# Patient Record
Sex: FEMALE | Race: WHITE
[De-identification: names, ages, dates, MRNs, and addresses within clinical notes are randomized per-mention and may not be internally consistent; named-entity substitution may affect disease eponyms.]

---

## 2018-11-27 ENCOUNTER — HOSPITAL ENCOUNTER (EMERGENCY)
Dept: HOSPITAL 76 - ED | Age: 23
LOS: 1 days | Discharge: HOME | End: 2018-11-28
Payer: MEDICAID

## 2018-11-27 ENCOUNTER — HOSPITAL ENCOUNTER (OUTPATIENT)
Dept: HOSPITAL 76 - EMS | Age: 23
Discharge: ANOTHER HOSPITAL | End: 2018-11-27
Attending: SURGERY
Payer: MEDICAID

## 2018-11-27 DIAGNOSIS — R07.9: Primary | ICD-10-CM

## 2018-11-27 LAB
BASOPHILS NFR BLD AUTO: 0.1 10^3/UL (ref 0–0.1)
BASOPHILS NFR BLD AUTO: 0.8 %
EOSINOPHIL # BLD AUTO: 0.2 10^3/UL (ref 0–0.7)
EOSINOPHIL NFR BLD AUTO: 1.5 %
ERYTHROCYTE [DISTWIDTH] IN BLOOD BY AUTOMATED COUNT: 13.7 % (ref 12–15)
HGB UR QL STRIP: 12.6 G/DL (ref 12–16)
LYMPHOCYTES # SPEC AUTO: 3.1 10^3/UL (ref 1.5–3.5)
LYMPHOCYTES NFR BLD AUTO: 26.3 %
MCH RBC QN AUTO: 28 PG (ref 27–31)
MCHC RBC AUTO-ENTMCNC: 34.3 G/DL (ref 32–36)
MCV RBC AUTO: 81.8 FL (ref 81–99)
MONOCYTES # BLD AUTO: 0.9 10^3/UL (ref 0–1)
MONOCYTES NFR BLD AUTO: 7.7 %
NEUTROPHILS # BLD AUTO: 7.5 10^3/UL (ref 1.5–6.6)
NEUTROPHILS # SNV AUTO: 11.9 X10^3/UL (ref 4.8–10.8)
NEUTROPHILS NFR BLD AUTO: 63.7 %
PDW BLD AUTO: 8.1 FL (ref 7.9–10.8)
PLATELET # BLD: 238 10^3/UL (ref 130–450)
RBC MAR: 4.51 10^6/UL (ref 4.2–5.4)

## 2018-11-27 PROCEDURE — 80053 COMPREHEN METABOLIC PANEL: CPT

## 2018-11-27 PROCEDURE — 81025 URINE PREGNANCY TEST: CPT

## 2018-11-27 PROCEDURE — 83690 ASSAY OF LIPASE: CPT

## 2018-11-27 PROCEDURE — 85025 COMPLETE CBC W/AUTO DIFF WBC: CPT

## 2018-11-27 PROCEDURE — 84484 ASSAY OF TROPONIN QUANT: CPT

## 2018-11-27 PROCEDURE — 99283 EMERGENCY DEPT VISIT LOW MDM: CPT

## 2018-11-27 PROCEDURE — 76705 ECHO EXAM OF ABDOMEN: CPT

## 2018-11-27 PROCEDURE — 71046 X-RAY EXAM CHEST 2 VIEWS: CPT

## 2018-11-27 PROCEDURE — 36415 COLL VENOUS BLD VENIPUNCTURE: CPT

## 2018-11-27 PROCEDURE — 93005 ELECTROCARDIOGRAM TRACING: CPT

## 2018-11-27 NOTE — ED PHYSICIAN DOCUMENTATION
PD HPI CHEST PAIN





- Stated complaint


Stated Complaint: CP, HEART RACING





- History obtained from


History obtained from: Patient





- History of Present Illness


Timing - onset: Enter  time (22:00), Today


Timing - onset during: Light activity


Timing - details: Abrupt onset


Pain level now: 4


Quality: Pain (burning)


Location: Substernal


Radiation: Abdominal, Right upper extremity


Improved by: Nothing


Worsened by: Other (no exacerbating factors)


Associated symptoms: Shortness of air, Palpitations


Similar symptoms before: Has not had sx before


Recently seen: Not recently seen





Review of Systems


Constitutional: reports: Reviewed and negative


Cardiac: reports: Chest pain / pressure, Palpitations


Respiratory: reports: Dyspnea


GI: reports: Abdominal Pain.  denies: Nausea, Vomiting





PD PAST MEDICAL HISTORY





- Past Medical History


Psych: Depression, Anxiety





- Past Surgical History


Past Surgical History: No





- Present Medications


Home Medications: 


                                Ambulatory Orders











 Medication  Instructions  Recorded  Confirmed


 


Bupropion HCl [Bupropion Xl]  11/27/18 


 


Norgestimate-Ethinyl Estradiol  11/27/18 





[Tri-Linyah Tablet]   


 


Quetiapine Fumarate  11/27/18 














- Allergies


Allergies/Adverse Reactions: 


                                    Allergies











Allergy/AdvReac Type Severity Reaction Status Date / Time


 


nut - unspecified Allergy Severe Anaphylaxis Verified 11/27/18 22:53


 


bee venom protein (honey bee) Allergy  Anaphylaxis Verified 11/27/18 22:52














- Social History


Does the pt smoke?: No


Smoking Status: Never smoker


Does the pt drink ETOH?: No


Does the pt have substance abuse?: No





- Immunizations


Immunizations are current?: No


Immunizations: TDAP >10years/unknown





PD ED PE NORMAL





- Vitals


Vital signs reviewed: Yes





- General


General: Alert and oriented X 3, No acute distress, Well developed/nourished





- Cardiac


Cardiac: RRR, No murmur, No gallop, No rub





- Respiratory


Respiratory: No respiratory distress, Clear bilaterally





- Abdomen


Abdomen: Soft, Non distended, Other (mild TTP epigastrium and RUQ)





- Back


Back: No CVA TTP





Results





- Vitals


Vitals: 


                                     Oxygen











O2 Source                      Room air

















- Labs


Labs: 


                                Laboratory Tests











  11/27/18 11/27/18 11/27/18





  23:50 23:50 23:50


 


WBC  11.9 H  


 


RBC  4.51  


 


Hgb  12.6  


 


Hct  36.9 L  


 


MCV  81.8  


 


MCH  28.0  


 


MCHC  34.3  


 


RDW  13.7  


 


Plt Count  238  


 


MPV  8.1  


 


Neut # (Auto)  7.5 H  


 


Lymph # (Auto)  3.1  


 


Mono # (Auto)  0.9  


 


Eos # (Auto)  0.2  


 


Baso # (Auto)  0.1  


 


Absolute Nucleated RBC  0.00  


 


Nucleated RBC %  0.0  


 


Sodium   134 L 


 


Potassium   3.4 L 


 


Chloride   99 L 


 


Carbon Dioxide   22 


 


Anion Gap   13.0 


 


BUN   13 


 


Creatinine   0.8 


 


Estimated GFR (MDRD)   90 


 


Glucose   259 H 


 


Calcium   8.8 


 


Total Bilirubin   0.4 


 


AST   22 


 


ALT   22 


 


Alkaline Phosphatase   85 


 


Troponin I    < 0.04


 


Total Protein   7.2 


 


Albumin   3.5 


 


Globulin   3.7 


 


Albumin/Globulin Ratio   0.9 L 


 


Lipase   19 L 


 


Ur Specific Gravity   


 


Urine HCG, Qual   














  11/28/18





  00:00


 


WBC 


 


RBC 


 


Hgb 


 


Hct 


 


MCV 


 


MCH 


 


MCHC 


 


RDW 


 


Plt Count 


 


MPV 


 


Neut # (Auto) 


 


Lymph # (Auto) 


 


Mono # (Auto) 


 


Eos # (Auto) 


 


Baso # (Auto) 


 


Absolute Nucleated RBC 


 


Nucleated RBC % 


 


Sodium 


 


Potassium 


 


Chloride 


 


Carbon Dioxide 


 


Anion Gap 


 


BUN 


 


Creatinine 


 


Estimated GFR (MDRD) 


 


Glucose 


 


Calcium 


 


Total Bilirubin 


 


AST 


 


ALT 


 


Alkaline Phosphatase 


 


Troponin I 


 


Total Protein 


 


Albumin 


 


Globulin 


 


Albumin/Globulin Ratio 


 


Lipase 


 


Ur Specific Gravity  1.020


 


Urine HCG, Qual  NEGATIVE














- Rads (name of study)


  ** chest xray


Radiology: Prelim report reviewed, See rad report





  ** RUQ US


Radiology: Prelim report reviewed, See rad report





PD MEDICAL DECISION MAKING





- ED course


Complexity details: reviewed results, re-evaluated patient, considered 

differential, d/w patient





Departure





- Departure


Disposition: 01 Home, Self Care


Clinical Impression: 


 Chest pain





Condition: Good


Instructions:  ED Chest Pain Atypical Unkn Cause, ED Hyperglycemia New Susp Tia

betes


Follow-Up: 


[facility] [Provider Group]


[facility] [Provider Group]


Comments: 


The tests performed tonight in the emergency department do not show what caused 

your symptoms. If your symptoms persist, your doctor might order further tests. 

If your symptoms worsen, you can always return to the emergency department for 

reevaluation. 


Your blood sugar was elevated tonight. This does not require further testing in 

the emergency department nor treatment, such as medications, at this time. 

However, you need to follow up with your doctor to have further testing. Call 

your doctor's office in the morning to arrange for next available appointment; 

if possible, it would be best to arrange an appointment by the end of the week.


Discharge Date/Time: 11/28/18 01:25

## 2018-11-28 VITALS — SYSTOLIC BLOOD PRESSURE: 109 MMHG | DIASTOLIC BLOOD PRESSURE: 79 MMHG

## 2018-11-28 LAB
ALBUMIN DIAFP-MCNC: 3.5 G/DL (ref 3.2–5.5)
ALBUMIN/GLOB SERPL: 0.9 {RATIO} (ref 1–2.2)
ALP SERPL-CCNC: 85 IU/L (ref 42–121)
ALT SERPL W P-5'-P-CCNC: 22 IU/L (ref 10–60)
ANION GAP SERPL CALCULATED.4IONS-SCNC: 13 MMOL/L (ref 6–13)
AST SERPL W P-5'-P-CCNC: 22 IU/L (ref 10–42)
BILIRUB BLD-MCNC: 0.4 MG/DL (ref 0.2–1)
BUN SERPL-MCNC: 13 MG/DL (ref 6–20)
CALCIUM UR-MCNC: 8.8 MG/DL (ref 8.5–10.3)
CHLORIDE SERPL-SCNC: 99 MMOL/L (ref 101–111)
CO2 SERPL-SCNC: 22 MMOL/L (ref 21–32)
CREAT SERPLBLD-SCNC: 0.8 MG/DL (ref 0.4–1)
GFRSERPLBLD MDRD-ARVRAT: 90 ML/MIN/{1.73_M2} (ref 89–?)
GLOBULIN SER-MCNC: 3.7 G/DL (ref 2.1–4.2)
GLUCOSE SERPL-MCNC: 259 MG/DL (ref 70–100)
HCG UR QL: NEGATIVE
LIPASE SERPL-CCNC: 19 U/L (ref 22–51)
PROT SPEC-MCNC: 7.2 G/DL (ref 6.7–8.2)
SODIUM SERPLBLD-SCNC: 134 MMOL/L (ref 135–145)
SP GR UR STRIP.AUTO: 1.02 (ref 1–1.03)

## 2018-11-28 NOTE — ULTRASOUND REPORT
Reason:  epigastric/RUQ pain

Procedure Date:  11/28/2018   

Accession Number:  [id number] / [id number]                    

Procedure:  US  - Abdomen Limited CPT Code:  

 

FULL RESULT:

 

 

EXAM:

ABDOMEN ULTRASOUND LIMITED, RUQ

 

EXAM DATE: 11/28/2018 12:04 AM.

 

CLINICAL HISTORY: Epigastric/right upper quadrant pain.

 

COMPARISON: None.

 

TECHNIQUE: Real-time scanning was performed with static images obtained.

 

FINDINGS:

Liver: Echogenic without gross focal abnormality seen. Main portal vein 

flow: Hepatopetal.

 

Gallbladder: Normal. No stones, wall thickening, or sonographic Aviles's 

sign.

 

Biliary System: CBD measures 4 mm. No intrahepatic or extrahepatic ductal 

dilatation.

 

Other: Visualized portions of the pancreas and right kidney are 

unremarkable.

IMPRESSION:

1. Gallbladder appears normal.

2. Fatty liver.

 

[facility]

## 2018-11-28 NOTE — XRAY REPORT
Reason:  chest pain

Procedure Date:  11/28/2018   

Accession Number:  [id number] / [id number]                    

Procedure:  XR  - Chest 2 View X-Ray CPT Code:  39300

 

FULL RESULT:

 

 

EXAM:

CHEST RADIOGRAPHY

 

EXAM DATE: 11/28/2018 12:41 AM.

 

CLINICAL HISTORY: Chest pain.

 

COMPARISON: None.

 

TECHNIQUE: 2 views.

 

FINDINGS:

Lungs/Pleura: No focal opacities evident. No pleural effusion. No 

pneumothorax. Normal volumes.

 

Mediastinum: Heart and mediastinal contours are unremarkable.

 

Other: None.

IMPRESSION: Normal 2-view chest radiography.

 

[facility]